# Patient Record
Sex: FEMALE | Race: WHITE | NOT HISPANIC OR LATINO | Employment: FULL TIME | URBAN - METROPOLITAN AREA
[De-identification: names, ages, dates, MRNs, and addresses within clinical notes are randomized per-mention and may not be internally consistent; named-entity substitution may affect disease eponyms.]

---

## 2024-09-19 ENCOUNTER — APPOINTMENT (OUTPATIENT)
Dept: CARDIOLOGY | Facility: CLINIC | Age: 34
End: 2024-09-19

## 2024-09-19 VITALS
HEART RATE: 88 BPM | SYSTOLIC BLOOD PRESSURE: 126 MMHG | BODY MASS INDEX: 26.62 KG/M2 | OXYGEN SATURATION: 97 % | HEIGHT: 61 IN | DIASTOLIC BLOOD PRESSURE: 84 MMHG | WEIGHT: 141 LBS

## 2024-09-19 DIAGNOSIS — R06.02 SHORTNESS OF BREATH: ICD-10-CM

## 2024-09-19 DIAGNOSIS — R61 DIAPHORESIS: ICD-10-CM

## 2024-09-19 DIAGNOSIS — R00.2 PALPITATIONS: ICD-10-CM

## 2024-09-19 DIAGNOSIS — R94.31 ABNORMAL EKG: ICD-10-CM

## 2024-09-19 DIAGNOSIS — R07.9 CHEST PAIN, UNSPECIFIED TYPE: Primary | ICD-10-CM

## 2024-09-19 DIAGNOSIS — R53.83 FATIGUE, UNSPECIFIED TYPE: ICD-10-CM

## 2024-09-19 DIAGNOSIS — M79.602 PAIN OF LEFT UPPER EXTREMITY: ICD-10-CM

## 2024-09-19 DIAGNOSIS — R42 DIZZINESS: ICD-10-CM

## 2024-09-19 DIAGNOSIS — Z87.898 HISTORY OF SEIZURE: ICD-10-CM

## 2024-09-19 DIAGNOSIS — Q24.9 CONGENITAL HEART DISEASE: ICD-10-CM

## 2024-09-19 PROCEDURE — 3008F BODY MASS INDEX DOCD: CPT | Performed by: INTERNAL MEDICINE

## 2024-09-19 PROCEDURE — 1036F TOBACCO NON-USER: CPT | Performed by: INTERNAL MEDICINE

## 2024-09-19 PROCEDURE — 99205 OFFICE O/P NEW HI 60 MIN: CPT | Performed by: INTERNAL MEDICINE

## 2024-09-19 RX ORDER — POLYMYXIN B SULFATE AND TRIMETHOPRIM 1; 10000 MG/ML; [USP'U]/ML
SOLUTION OPHTHALMIC
COMMUNITY

## 2024-09-19 RX ORDER — SERTRALINE HYDROCHLORIDE 25 MG/1
1 TABLET, FILM COATED ORAL DAILY
COMMUNITY

## 2024-09-19 RX ORDER — PAROXETINE 10 MG/1
1 TABLET, FILM COATED ORAL DAILY
COMMUNITY

## 2024-09-19 RX ORDER — SILVER SULFADIAZINE 10 G/1000G
CREAM TOPICAL
COMMUNITY

## 2024-09-19 RX ORDER — NORETHINDRONE ACETATE AND ETHINYL ESTRADIOL AND FERROUS FUMARATE 1MG-20(21)
KIT ORAL
COMMUNITY
Start: 2024-05-18

## 2024-09-19 RX ORDER — ALBUTEROL SULFATE 90 UG/1
2 INHALANT RESPIRATORY (INHALATION) EVERY 4 HOURS PRN
COMMUNITY
Start: 2024-03-02

## 2024-09-19 RX ORDER — AMOXICILLIN 500 MG/1
1 CAPSULE ORAL 3 TIMES DAILY
COMMUNITY

## 2024-09-19 NOTE — PROGRESS NOTES
CARDIOLOGY CONSULTATION NOTE       Patient:    Jose De Jesus Blackwood    YOB: 1990  MRN:    [id number]    Date:   9/19/2024     Reason for Visit: Consultation for chest pain symptoms.     IMPRESSION:      Chest pain  Shortness of breath, dyspnea on exertion  Left arm discomfort question angina  Lightheadedness with presyncope  Diaphoresis episodes  Congenital heart disease history, post heart closure at birth  Seizure episode, post delivery.  Abnormal resting electrocardiogram  Short [place] interval  Poor R wave anterior progression, question anterior ischemia.   Prior COVID history March 2024.  Otherwise as per assessment below.    RECOMMENDATIONS:      Patient has above-noted history and findings.  Given her presenting symptomatology, abnormal EKG and past history would suggest the following cardiovascular evaluation: Echocardiogram with contrast bubble study, 7-day Zio patch, Lexiscan perfusion stress testing and CT calcium scoring.  Laboratory studies including lipid profile thyroid function studies as well.  Further recommendations including possible invasive testing as warranted.    The patient will continue her current treatment otherwise.    Exercise dietary program.  Hydration.    [employer] portal use was encouraged.    We will plan to see back after the above testing with Laboratory Studies and ECG as noted.     Patient will follow up with their primary physician for general care.    The patient knows to contact medical care earlier if need be.    HPI:     Jose De Jesus Blackwood was seen in cardiac evaluation at the  Cardiology office September 19, 2024.      The patients problems are listed as in the impression above.    Electronic medical records reviewed.    Patient is a pleasant 34-year-old woman with history of congenital heart disease post heart hole closure at birth but no other significant cardiovascular history: Presents after emergency room visit 9/3/2024, [facility], for chest pain symptoms and dyspnea on  exertion.  ER records were reviewed.  She had a chest x-ray and laboratory studies including D-dimer which were negative.  Troponins were negative x 2.  EKG noted a short [place] interval poor R wave anterior progression and anterior possible ischemia.  She was discharged home and was suggested to follow-up with cardiology for further evaluation and treatment.    She presents now and states that she has had since chest discomfort with and without exertion.  She has dyspnea on exertion as well.  She does note occasional palpitations.  She has episodes where she gets pale and feels like she might pass out.  She has lightheadedness at times.  She has no other cardiovascular complaints.  She denies snoring.    Patient denies TIA or CVA symptoms.  No CHF or Edema.  No GI,  or Bleeding Issues. No Recent Fever or Chills.     Cardiovascular and general review of systems is otherwise negative.    A 14-system review is otherwise negative, other than noted.    ALLERGIES:     No Known Allergies     MEDICATIONS:     Current Outpatient Medications   Medication Instructions    albuterol 90 mcg/actuation inhaler 2 puffs, inhalation, Every 4 hours PRN    amoxicillin (Amoxil) 500 mg capsule 1 capsule, oral, 3 times daily    Junel FE 1/20, 28, 1 mg-20 mcg (21)/75 mg (7) tablet TAKE 1 TABLET BY MOUTH EVERY DAY AS DIRECTED FOR 28 DAYS    PARoxetine (Paxil) 10 mg tablet 1 tablet, oral, Daily    polymyxin B sulf-trimethoprim (Polytrim) ophthalmic solution INSTILL 1 DROP INTO AFFECTED EYE EVERY 6 HOURS    sertraline (Zoloft) 25 mg tablet 1 tablet, oral, Daily    silver sulfADIAZINE (Silvadene) 1 % cream APPLY 1 APPLICATION(S) TWICE A DAY BY TOPICAL ROUTE FOR 10 DAYS.       PAST MEDICAL HISTORY:   As per impression above.  No other significant past medical or surgical history appreciated.    SOCIAL HISTORY:   .  One 13-year-old child.    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    FAMILY HISTORY:   Negative family history of CAD    VITALS:     Vitals:    09/19/24 1248   BP: 126/84   Pulse: 88   SpO2: 97%       Wt Readings from Last 4 Encounters:   09/19/24 64 kg (141 lb)       PHYSICAL EXAMINATION:      General: No acute distress. Vital signs as noted. Alert and oriented.  Head And Neck Examination: No jugular venous distention, no carotid bruits, no mass. Carotid upstrokes preserved. Oral mucosa moist.  No xanthelasma. Head and neck examination otherwise unremarkable.  Lungs: Clear to auscultation and percussion. No wheezes, no rales,  and no rhonchi.  Chest: Excursion appeared to be normal. No chest wall tenderness on palpation.  Heart: Normal S1 and S2. No S3. No S4. No rub. Grade 1/6 systolic murmur, best heard at the left sternal border. Point of maximal impulse was within normal limits.  Abdomen: Soft. Nontender. No organomegaly. No bruits. No masses.  Extremities: No bipedal edema. No clubbing. No cyanosis.  Pulses are strong throughout. No bruits.  Musculoskeletal Exam: No ulcers, otherwise unremarkable.  Neuro: Neurologically appeared grossly intact.    ELECTROCARDIOGRAM:      9/3/2024:  Sinus rhythm, short [place] interval, poor wave anterior progression, question anterior T wave ischemic changes.    CARDIAC TESTING:      None this visit for review    LABORATORY DATA:      9/3/2024:  [facility] ER,  Chem-7, CBC, magnesium, D-dimer negative.  Troponins negative x 2.    RADIOLOGY:     Chest x-ray, [facility] ER, 9/3/2024:  DANIELA Morris MD, [facility] /  Cardiology      Of Note:  [employer] voice recognition dictation software was utilized partially in the preparation of this note, therefore, inaccuracies in spelling, word choice and punctuation may have occurred which were not recognized the time of signing.    Patient was seen and examined with total time of visit including chart preparation, rooming, and chart completion exceeding 40 minutes.    ----

## 2024-09-25 ENCOUNTER — LAB (OUTPATIENT)
Dept: LAB | Facility: LAB | Age: 34
End: 2024-09-25

## 2024-09-25 DIAGNOSIS — R06.02 SHORTNESS OF BREATH: ICD-10-CM

## 2024-09-25 DIAGNOSIS — R00.2 PALPITATIONS: ICD-10-CM

## 2024-09-25 DIAGNOSIS — R94.31 ABNORMAL EKG: ICD-10-CM

## 2024-09-25 DIAGNOSIS — R07.9 CHEST PAIN, UNSPECIFIED TYPE: ICD-10-CM

## 2024-09-25 DIAGNOSIS — Z87.898 HISTORY OF SEIZURE: ICD-10-CM

## 2024-09-25 DIAGNOSIS — M79.602 PAIN OF LEFT UPPER EXTREMITY: ICD-10-CM

## 2024-09-25 DIAGNOSIS — R53.83 FATIGUE, UNSPECIFIED TYPE: ICD-10-CM

## 2024-09-25 DIAGNOSIS — R42 DIZZINESS: ICD-10-CM

## 2024-09-25 DIAGNOSIS — R61 DIAPHORESIS: ICD-10-CM

## 2024-09-25 DIAGNOSIS — Q24.9 CONGENITAL HEART DISEASE: ICD-10-CM

## 2024-09-25 PROCEDURE — 85027 COMPLETE CBC AUTOMATED: CPT

## 2024-09-25 PROCEDURE — 86141 C-REACTIVE PROTEIN HS: CPT

## 2024-09-25 PROCEDURE — 36415 COLL VENOUS BLD VENIPUNCTURE: CPT

## 2024-09-25 PROCEDURE — 85652 RBC SED RATE AUTOMATED: CPT

## 2024-09-25 PROCEDURE — 84443 ASSAY THYROID STIM HORMONE: CPT

## 2024-09-25 PROCEDURE — 82248 BILIRUBIN DIRECT: CPT

## 2024-09-25 PROCEDURE — 80053 COMPREHEN METABOLIC PANEL: CPT

## 2024-09-25 PROCEDURE — 80061 LIPID PANEL: CPT

## 2024-09-26 LAB
ALBUMIN SERPL BCP-MCNC: 4.8 G/DL (ref 3.4–5)
ALP SERPL-CCNC: 51 U/L (ref 33–110)
ALT SERPL W P-5'-P-CCNC: 13 U/L (ref 7–45)
ANION GAP SERPL CALC-SCNC: 15 MMOL/L (ref 10–20)
AST SERPL W P-5'-P-CCNC: 13 U/L (ref 9–39)
BILIRUB DIRECT SERPL-MCNC: 0.1 MG/DL (ref 0–0.3)
BILIRUB SERPL-MCNC: 0.5 MG/DL (ref 0–1.2)
BUN SERPL-MCNC: 10 MG/DL (ref 6–23)
CALCIUM SERPL-MCNC: 9.8 MG/DL (ref 8.6–10.6)
CHLORIDE SERPL-SCNC: 100 MMOL/L (ref 98–107)
CHOLEST SERPL-MCNC: 208 MG/DL (ref 0–199)
CHOLESTEROL/HDL RATIO: 4.3
CO2 SERPL-SCNC: 27 MMOL/L (ref 21–32)
CREAT SERPL-MCNC: 0.52 MG/DL (ref 0.5–1.05)
CRP SERPL HS-MCNC: 5.5 MG/L
EGFRCR SERPLBLD CKD-EPI 2021: >90 ML/MIN/1.73M*2
ERYTHROCYTE [DISTWIDTH] IN BLOOD BY AUTOMATED COUNT: 12.3 % (ref 11.5–14.5)
ERYTHROCYTE [SEDIMENTATION RATE] IN BLOOD BY WESTERGREN METHOD: 6 MM/H (ref 0–20)
GLUCOSE SERPL-MCNC: 75 MG/DL (ref 74–99)
HCT VFR BLD AUTO: 44.2 % (ref 36–46)
HDLC SERPL-MCNC: 48.7 MG/DL
HGB BLD-MCNC: 14.6 G/DL (ref 12–16)
LDLC SERPL CALC-MCNC: 133 MG/DL
MCH RBC QN AUTO: 30.4 PG (ref 26–34)
MCHC RBC AUTO-ENTMCNC: 33 G/DL (ref 32–36)
MCV RBC AUTO: 92 FL (ref 80–100)
NON HDL CHOLESTEROL: 159 MG/DL (ref 0–149)
NRBC BLD-RTO: 0 /100 WBCS (ref 0–0)
PLATELET # BLD AUTO: 372 X10*3/UL (ref 150–450)
POTASSIUM SERPL-SCNC: 3.9 MMOL/L (ref 3.5–5.3)
PROT SERPL-MCNC: 7.9 G/DL (ref 6.4–8.2)
RBC # BLD AUTO: 4.81 X10*6/UL (ref 4–5.2)
SODIUM SERPL-SCNC: 138 MMOL/L (ref 136–145)
TRIGL SERPL-MCNC: 134 MG/DL (ref 0–149)
TSH SERPL-ACNC: 1.22 MIU/L (ref 0.44–3.98)
VLDL: 27 MG/DL (ref 0–40)
WBC # BLD AUTO: 10.4 X10*3/UL (ref 4.4–11.3)

## 2024-09-27 ENCOUNTER — OFFICE VISIT (OUTPATIENT)
Dept: PRIMARY CARE | Facility: CLINIC | Age: 34
End: 2024-09-27

## 2024-09-27 VITALS
WEIGHT: 141 LBS | SYSTOLIC BLOOD PRESSURE: 106 MMHG | OXYGEN SATURATION: 98 % | HEART RATE: 96 BPM | DIASTOLIC BLOOD PRESSURE: 62 MMHG | RESPIRATION RATE: 16 BRPM | HEIGHT: 61 IN | BODY MASS INDEX: 26.62 KG/M2

## 2024-09-27 DIAGNOSIS — Z30.9 ENCOUNTER FOR CONTRACEPTIVE MANAGEMENT, UNSPECIFIED TYPE: Primary | ICD-10-CM

## 2024-09-27 DIAGNOSIS — F32.A DEPRESSION, UNSPECIFIED DEPRESSION TYPE: ICD-10-CM

## 2024-09-27 PROCEDURE — 3008F BODY MASS INDEX DOCD: CPT | Performed by: INTERNAL MEDICINE

## 2024-09-27 PROCEDURE — 1036F TOBACCO NON-USER: CPT | Performed by: INTERNAL MEDICINE

## 2024-09-27 PROCEDURE — 99214 OFFICE O/P EST MOD 30 MIN: CPT | Performed by: INTERNAL MEDICINE

## 2024-09-27 RX ORDER — FLUOXETINE 20 MG/1
20 TABLET ORAL DAILY
Qty: 30 TABLET | Refills: 5 | Status: SHIPPED | OUTPATIENT
Start: 2024-09-27 | End: 2024-09-27 | Stop reason: SDUPTHER

## 2024-09-27 RX ORDER — FLUOXETINE 20 MG/1
20 TABLET ORAL DAILY
Qty: 30 TABLET | Refills: 5 | Status: SHIPPED | OUTPATIENT
Start: 2024-09-27 | End: 2025-03-26

## 2024-09-27 ASSESSMENT — PATIENT HEALTH QUESTIONNAIRE - PHQ9
SUM OF ALL RESPONSES TO PHQ9 QUESTIONS 1 AND 2: 4
7. TROUBLE CONCENTRATING ON THINGS, SUCH AS READING THE NEWSPAPER OR WATCHING TELEVISION: NEARLY EVERY DAY
4. FEELING TIRED OR HAVING LITTLE ENERGY: NEARLY EVERY DAY
1. LITTLE INTEREST OR PLEASURE IN DOING THINGS: MORE THAN HALF THE DAYS
6. FEELING BAD ABOUT YOURSELF - OR THAT YOU ARE A FAILURE OR HAVE LET YOURSELF OR YOUR FAMILY DOWN: NOT AT ALL
2. FEELING DOWN, DEPRESSED OR HOPELESS: MORE THAN HALF THE DAYS
SUM OF ALL RESPONSES TO PHQ QUESTIONS 1-9: 17
3. TROUBLE FALLING OR STAYING ASLEEP OR SLEEPING TOO MUCH: MORE THAN HALF THE DAYS
10. IF YOU CHECKED OFF ANY PROBLEMS, HOW DIFFICULT HAVE THESE PROBLEMS MADE IT FOR YOU TO DO YOUR WORK, TAKE CARE OF THINGS AT HOME, OR GET ALONG WITH OTHER PEOPLE: NOT DIFFICULT AT ALL
5. POOR APPETITE OR OVEREATING: NEARLY EVERY DAY
8. MOVING OR SPEAKING SO SLOWLY THAT OTHER PEOPLE COULD HAVE NOTICED. OR THE OPPOSITE, BEING SO FIGETY OR RESTLESS THAT YOU HAVE BEEN MOVING AROUND A LOT MORE THAN USUAL: MORE THAN HALF THE DAYS
9. THOUGHTS THAT YOU WOULD BE BETTER OFF DEAD, OR OF HURTING YOURSELF: NOT AT ALL

## 2024-09-27 NOTE — PROGRESS NOTES
"Subjective   Patient ID: [id number]     Jose De Jesus Blackwood is a 34 y.o. female who presents for New Patient Visit (Patient would like to disucss contraceptive pill today. //Patient would also like to review lab results today. ) and Palpitations (Chest pain and SOB, patient had EKG done on 9/4/2024).  No current outpatient medications on file.  HPI  34-year-old patient presented to the office today to establish care and discuss few concerns.    Patient has been recently experiencing episodes of chest pain and shortness of breath as well as episodes of palpitations.  She stated that she had a cardiac procedure when she was born but she is not really sure what kind of cardiac condition she had so most likely she had a congenital cardiac condition.  She said that the chest pain can be 4/10 in severity it is mostly located centrally into the left side sometimes if she feels a heaviness in her left arm and she feels short of breath with it.  It lasts for few minutes to hours at times and then it goes away.  She was seen and evaluated by cardiology and currently she is scheduled to proceed with stress test and an echocardiogram and calcium scoring test and a Holter monitor.  No loss of consciousness no syncope.    Patient is dealing with significant amount of stress in her life lately that could be contributing to her presentation she thinks she could be dealing with depression at this point because of the environmental stressors that she is dealing with.    Patient also has been discussed options for contraception.  Review of system was reviewed all normal except what is noted in HPI   Past Medical History:   Diagnosis Date    Abnormal ECG 9/3/2024      Objective   /62 (BP Location: Right arm, Patient Position: Sitting, BP Cuff Size: Adult)   Pulse 96   Resp 16   Ht 1.549 m (5' 1\")   Wt 64 kg (141 lb)   SpO2 98%   BMI 26.64 kg/m²      Physical Exam  Constitutional:       Appearance: Normal appearance.   Cardiovascular: "      Rate and Rhythm: Normal rate and regular rhythm.      Pulses: Normal pulses.      Heart sounds: Normal heart sounds.   Pulmonary:      Effort: Pulmonary effort is normal.      Breath sounds: Normal breath sounds.   Neurological:      Mental Status: She is alert.         Assessment/Plan   Problem List Items Addressed This Visit    None  34-year-old patient with the following issues.    1.  Status post recent cardiac evaluation for chest pain shortness of breath palpitation heaviness in her left arm and occasional lightheadedness.  Patient does have history of congenital heart disease?.  She is currently scheduled to proceed with stress test and echocardiogram and cardiac monitor as well as calcium scoring she was encouraged to go ahead and proceed with all these testing and to follow-up with cardiology as scheduled.    2.  Concerns regarding environmental stressors leading to depression she is can do a trial of Prozac and I will see her back in 6 to 8 weeks for follow-up.    3.  Contraception management patient wants to discuss IUD options with OB/GYN if it was provided.    No other active issues or concerns during this visit I will see the patient back in the office as previously scheduled and sooner if needed.    Rukhsana Zheng MD

## 2024-10-01 ENCOUNTER — APPOINTMENT (OUTPATIENT)
Dept: PRIMARY CARE | Facility: CLINIC | Age: 34
End: 2024-10-01

## 2024-10-03 ENCOUNTER — APPOINTMENT (OUTPATIENT)
Dept: PRIMARY CARE | Facility: CLINIC | Age: 34
End: 2024-10-03

## 2024-10-16 ENCOUNTER — APPOINTMENT (OUTPATIENT)
Dept: CARDIOLOGY | Facility: CLINIC | Age: 34
End: 2024-10-16

## 2024-10-16 ENCOUNTER — HOSPITAL ENCOUNTER (OUTPATIENT)
Dept: CARDIOLOGY | Facility: CLINIC | Age: 34
Discharge: HOME | End: 2024-10-16

## 2024-10-16 ENCOUNTER — HOSPITAL ENCOUNTER (OUTPATIENT)
Dept: RADIOLOGY | Facility: CLINIC | Age: 34
Discharge: HOME | End: 2024-10-16

## 2024-10-16 DIAGNOSIS — R53.83 FATIGUE, UNSPECIFIED TYPE: ICD-10-CM

## 2024-10-16 DIAGNOSIS — R94.31 ABNORMAL EKG: ICD-10-CM

## 2024-10-16 DIAGNOSIS — R42 DIZZINESS: ICD-10-CM

## 2024-10-16 DIAGNOSIS — R00.2 PALPITATIONS: ICD-10-CM

## 2024-10-16 DIAGNOSIS — M79.602 PAIN OF LEFT UPPER EXTREMITY: ICD-10-CM

## 2024-10-16 DIAGNOSIS — Z87.898 HISTORY OF SEIZURE: ICD-10-CM

## 2024-10-16 DIAGNOSIS — R61 DIAPHORESIS: ICD-10-CM

## 2024-10-16 DIAGNOSIS — Q24.9 CONGENITAL HEART DISEASE: ICD-10-CM

## 2024-10-16 DIAGNOSIS — Q24.9 CONGENITAL HEART DISEASE: Primary | ICD-10-CM

## 2024-10-16 DIAGNOSIS — R06.02 SHORTNESS OF BREATH: ICD-10-CM

## 2024-10-16 DIAGNOSIS — R07.9 CHEST PAIN, UNSPECIFIED TYPE: ICD-10-CM

## 2024-10-16 PROCEDURE — 93306 TTE W/DOPPLER COMPLETE: CPT

## 2024-10-16 PROCEDURE — 3430000001 HC RX 343 DIAGNOSTIC RADIOPHARMACEUTICALS: Performed by: INTERNAL MEDICINE

## 2024-10-16 PROCEDURE — A9502 TC99M TETROFOSMIN: HCPCS | Performed by: INTERNAL MEDICINE

## 2024-10-16 PROCEDURE — 93306 TTE W/DOPPLER COMPLETE: CPT | Performed by: INTERNAL MEDICINE

## 2024-10-16 PROCEDURE — 2500000004 HC RX 250 GENERAL PHARMACY W/ HCPCS (ALT 636 FOR OP/ED): Performed by: INTERNAL MEDICINE

## 2024-10-16 PROCEDURE — 78452 HT MUSCLE IMAGE SPECT MULT: CPT

## 2024-10-16 PROCEDURE — 93017 CV STRESS TEST TRACING ONLY: CPT

## 2024-10-16 RX ORDER — REGADENOSON 0.08 MG/ML
0.4 INJECTION, SOLUTION INTRAVENOUS ONCE
Status: COMPLETED | OUTPATIENT
Start: 2024-10-16 | End: 2024-10-16

## 2024-10-17 LAB
AORTIC VALVE MEAN GRADIENT: 2.1 MMHG
AORTIC VALVE PEAK VELOCITY: 0.9 M/S
AV PEAK GRADIENT: 3.2 MMHG
AVA (PEAK VEL): 2.81 CM2
AVA (VTI): 2.7 CM2
EJECTION FRACTION APICAL 4 CHAMBER: 71.2
EJECTION FRACTION: 60 %
LEFT ATRIUM VOLUME AREA LENGTH INDEX BSA: 16.9 ML/M2
LEFT VENTRICLE INTERNAL DIMENSION DIASTOLE: 3.91 CM (ref 3.5–6)
LEFT VENTRICULAR OUTFLOW TRACT DIAMETER: 1.91 CM
LV EJECTION FRACTION BIPLANE: 63 %
MITRAL VALVE E/A RATIO: 2.1
RIGHT VENTRICLE FREE WALL PEAK S': 13.44 CM/S
TRICUSPID ANNULAR PLANE SYSTOLIC EXCURSION: 2.1 CM

## 2024-10-30 ENCOUNTER — APPOINTMENT (OUTPATIENT)
Dept: PRIMARY CARE | Facility: CLINIC | Age: 34
End: 2024-10-30

## 2024-11-06 ENCOUNTER — APPOINTMENT (OUTPATIENT)
Dept: OBSTETRICS AND GYNECOLOGY | Facility: CLINIC | Age: 34
End: 2024-11-06

## 2024-11-06 VITALS
BODY MASS INDEX: 27.19 KG/M2 | WEIGHT: 144 LBS | DIASTOLIC BLOOD PRESSURE: 58 MMHG | SYSTOLIC BLOOD PRESSURE: 130 MMHG | HEIGHT: 61 IN

## 2024-11-06 DIAGNOSIS — Z30.9 ENCOUNTER FOR CONTRACEPTIVE MANAGEMENT, UNSPECIFIED TYPE: ICD-10-CM

## 2024-11-06 PROCEDURE — 1036F TOBACCO NON-USER: CPT | Performed by: ADVANCED PRACTICE MIDWIFE

## 2024-11-06 PROCEDURE — 3008F BODY MASS INDEX DOCD: CPT | Performed by: ADVANCED PRACTICE MIDWIFE

## 2024-11-06 PROCEDURE — 99203 OFFICE O/P NEW LOW 30 MIN: CPT | Performed by: ADVANCED PRACTICE MIDWIFE

## 2024-11-06 RX ORDER — NORETHINDRONE ACETATE AND ETHINYL ESTRADIOL 1.5-30(21)
1 KIT ORAL DAILY
Qty: 28 TABLET | Refills: 11 | Status: SHIPPED | OUTPATIENT
Start: 2024-11-06

## 2024-11-06 ASSESSMENT — PAIN SCALES - GENERAL: PAINLEVEL_OUTOF10: 0-NO PAIN

## 2024-11-06 NOTE — PROGRESS NOTES
Subjective   Patient ID: Jose De Jesus Blackwood is a 34 y.o. female who presents for New Patient Visit (Discuss BC ( has had Mirena in the past )/Per pt last pap was in [place] ( neg ) 2023).    HPI    Pt. Here to discuss contraception    Had a Mirena [id number]  took it out because she wasn't sexually active-was overall happy with bleeding pattern     Was on OCPs, put on about a year ago, ran out a few months ago-likes these ok, thinking about restarting those today   Denies any hx of migraine, DVT, smoking, HTN    Last pap in [place] ~ 1 year ago  No history of abnormal paps     LMP: 10/16-10/23  , no contraception currently but does not desire pregnancy       Does not desire future fertility     Does not currently have insurance so is worried about cost     Review of Systems    Objective   Physical Exam  Constitutional:       Appearance: Normal appearance. She is normal weight.   Neurological:      Mental Status: She is alert.   Psychiatric:         Mood and Affect: Mood normal.         Behavior: Behavior normal.         Thought Content: Thought content normal.         Judgment: Judgment normal.         Assessment/Plan   Problem List Items Addressed This Visit             ICD-10-CM       Medium    Encounter for contraceptive management Z30.9    Relevant Medications    norethindrone-e.estradioL-iron (Microgestin FE 1.5/30) 1.5 mg-30 mcg (21)/75 mg (7) tablet            ELISE Katz 24 9:03 AM

## 2024-11-06 NOTE — ASSESSMENT & PLAN NOTE
Discussed Reietta as a cost effective option or seeking our services at a family planning clinic or something with a fee schedule   Will utilize good RX now for her  OCP

## 2024-11-10 ASSESSMENT — PROMIS GLOBAL HEALTH SCALE
RATE_QUALITY_OF_LIFE: EXCELLENT
RATE_MENTAL_HEALTH: VERY GOOD
RATE_SOCIAL_SATISFACTION: EXCELLENT
RATE_AVERAGE_FATIGUE: MILD
CARRYOUT_PHYSICAL_ACTIVITIES: COMPLETELY
RATE_GENERAL_HEALTH: EXCELLENT
RATE_AVERAGE_PAIN: 0
CARRYOUT_SOCIAL_ACTIVITIES: EXCELLENT
RATE_PHYSICAL_HEALTH: VERY GOOD
EMOTIONAL_PROBLEMS: NEVER

## 2024-11-13 ENCOUNTER — HOSPITAL ENCOUNTER (OUTPATIENT)
Dept: RADIOLOGY | Facility: HOSPITAL | Age: 34
Discharge: HOME | End: 2024-11-13

## 2024-11-13 DIAGNOSIS — R07.9 CHEST PAIN, UNSPECIFIED TYPE: ICD-10-CM

## 2024-11-13 DIAGNOSIS — R61 DIAPHORESIS: ICD-10-CM

## 2024-11-13 DIAGNOSIS — M79.602 PAIN OF LEFT UPPER EXTREMITY: ICD-10-CM

## 2024-11-13 DIAGNOSIS — Q24.9 CONGENITAL HEART DISEASE: ICD-10-CM

## 2024-11-13 DIAGNOSIS — R00.2 PALPITATIONS: ICD-10-CM

## 2024-11-13 DIAGNOSIS — R06.02 SHORTNESS OF BREATH: ICD-10-CM

## 2024-11-13 DIAGNOSIS — R94.31 ABNORMAL EKG: ICD-10-CM

## 2024-11-13 DIAGNOSIS — R53.83 FATIGUE, UNSPECIFIED TYPE: ICD-10-CM

## 2024-11-13 DIAGNOSIS — Z87.898 HISTORY OF SEIZURE: ICD-10-CM

## 2024-11-13 DIAGNOSIS — R42 DIZZINESS: ICD-10-CM

## 2024-11-14 ENCOUNTER — APPOINTMENT (OUTPATIENT)
Dept: PRIMARY CARE | Facility: CLINIC | Age: 34
End: 2024-11-14

## 2024-11-14 VITALS
BODY MASS INDEX: 26.81 KG/M2 | HEIGHT: 61 IN | WEIGHT: 142 LBS | DIASTOLIC BLOOD PRESSURE: 72 MMHG | SYSTOLIC BLOOD PRESSURE: 106 MMHG | HEART RATE: 74 BPM | OXYGEN SATURATION: 99 %

## 2024-11-14 DIAGNOSIS — M25.572 ACUTE LEFT ANKLE PAIN: ICD-10-CM

## 2024-11-14 DIAGNOSIS — Z00.00 HEALTH CARE MAINTENANCE: Primary | ICD-10-CM

## 2024-11-14 DIAGNOSIS — F32.A DEPRESSION, UNSPECIFIED DEPRESSION TYPE: ICD-10-CM

## 2024-11-14 PROCEDURE — 3008F BODY MASS INDEX DOCD: CPT | Performed by: INTERNAL MEDICINE

## 2024-11-14 PROCEDURE — 99395 PREV VISIT EST AGE 18-39: CPT | Performed by: INTERNAL MEDICINE

## 2024-11-14 RX ORDER — FLUOXETINE 20 MG/1
20 TABLET ORAL DAILY
Qty: 30 TABLET | Refills: 5 | Status: SHIPPED | OUTPATIENT
Start: 2024-11-14 | End: 2025-05-13

## 2024-11-14 RX ORDER — NAPROXEN 500 MG/1
500 TABLET ORAL 2 TIMES DAILY PRN
Qty: 60 TABLET | Refills: 0 | Status: SHIPPED | OUTPATIENT
Start: 2024-11-14 | End: 2025-02-12

## 2024-11-14 ASSESSMENT — PATIENT HEALTH QUESTIONNAIRE - PHQ9
1. LITTLE INTEREST OR PLEASURE IN DOING THINGS: NOT AT ALL
SUM OF ALL RESPONSES TO PHQ9 QUESTIONS 1 AND 2: 0
2. FEELING DOWN, DEPRESSED OR HOPELESS: NOT AT ALL

## 2024-11-14 NOTE — PROGRESS NOTES
"`Subjective   Patient ID: [id number]     Jose De Jesus Blackwood is a 34 y.o. female who presents for Annual Exam.    Current Outpatient Medications:     FLUoxetine (PROzac) 20 mg tablet, Take 1 tablet (20 mg) by mouth once daily., Disp: 30 tablet, Rfl: 5    norethindrone-e.estradioL-iron (Microgestin FE 1.5/30) 1.5 mg-30 mcg (21)/75 mg (7) tablet, Take 1 tablet by mouth once daily., Disp: 28 tablet, Rfl: 11  HPI  34-year-old patient presented to the office today for her annual exam patient is doing well denying episodes of chest pain and difficulty breathing she has been thoroughly evaluated by cardiology for these exact reasons with occasional episodes of chest pain and shortness of breath her stress test came back negative for ischemia and cardiac monitor came back negative for any significant abnormalities her echocardiogram did not indicate the presence of any valvular heart disease patient was reassured.  Now she thinks her episodes were related to anxiety I did a trial of Prozac she has not taken the medication lately she is dealing with significant amount of stress in her life lately and now she is more open to trying the Prozac.    She denies abdominal pain nausea vomiting changes in the bowel movement or blood in the stool no urine symptoms.  Review of system was reviewed all normal except what is noted in HPI   Past Medical History:   Diagnosis Date    Abnormal ECG 9/3/2024      Objective   /72 (BP Location: Left arm, Patient Position: Sitting, BP Cuff Size: Adult)   Pulse 74   Ht 1.549 m (5' 1\")   Wt 64.4 kg (142 lb)   LMP 10/16/2024   SpO2 99%   BMI 26.83 kg/m²      Physical Exam  Constitutional:       Appearance: Normal appearance.   Cardiovascular:      Rate and Rhythm: Normal rate and regular rhythm.      Pulses: Normal pulses.      Heart sounds: Normal heart sounds.   Pulmonary:      Effort: Pulmonary effort is normal.      Breath sounds: Normal breath sounds.   Chest:      Comments: Breast exam " completed no masses asymmetry or discharge.  Neurological:      Mental Status: She is alert.         Assessment/Plan   Problem List Items Addressed This Visit    None  34-year-old patient with the following issues.    1.  Generalized anxiety disorder patient is dealing with a lot of stress in her life lately and we did do a trial of Prozac patient suspended it and did not actually take it but now she is willing to take it and do a trial if she does I will see her back in 6 to 8 weeks for follow-up.    2.  Previous evaluation for occasional episodes of chest pain and shortness of breath status post negative workup and evaluation with negative stress test cardiac monitor and echocardiogram patient is reassured.    3.  Healthcare maintenance issues patient is up-to-date on her Pap smear and preventative care measures.    Disposition I will see the patient back in the office in 1 year for repeat physical in 6 weeks for follow-up.    Rukhsana Zheng MD

## 2024-12-12 ENCOUNTER — APPOINTMENT (OUTPATIENT)
Dept: CARDIOLOGY | Facility: CLINIC | Age: 34
End: 2024-12-12

## 2024-12-12 VITALS
DIASTOLIC BLOOD PRESSURE: 72 MMHG | WEIGHT: 143 LBS | HEART RATE: 95 BPM | BODY MASS INDEX: 27 KG/M2 | HEIGHT: 61 IN | SYSTOLIC BLOOD PRESSURE: 102 MMHG | OXYGEN SATURATION: 96 %

## 2024-12-12 DIAGNOSIS — R94.31 ABNORMAL EKG: ICD-10-CM

## 2024-12-12 DIAGNOSIS — R00.2 PALPITATIONS: ICD-10-CM

## 2024-12-12 DIAGNOSIS — Z87.898 HISTORY OF SEIZURE: ICD-10-CM

## 2024-12-12 DIAGNOSIS — R07.9 CHEST PAIN, UNSPECIFIED TYPE: Primary | ICD-10-CM

## 2024-12-12 DIAGNOSIS — R53.83 FATIGUE, UNSPECIFIED TYPE: ICD-10-CM

## 2024-12-12 DIAGNOSIS — Q24.9 CONGENITAL HEART DISEASE: ICD-10-CM

## 2024-12-12 DIAGNOSIS — R06.02 SHORTNESS OF BREATH: ICD-10-CM

## 2024-12-12 PROCEDURE — 99214 OFFICE O/P EST MOD 30 MIN: CPT | Performed by: INTERNAL MEDICINE

## 2024-12-12 PROCEDURE — 1036F TOBACCO NON-USER: CPT | Performed by: INTERNAL MEDICINE

## 2024-12-12 PROCEDURE — 3008F BODY MASS INDEX DOCD: CPT | Performed by: INTERNAL MEDICINE

## 2024-12-12 NOTE — PROGRESS NOTES
CARDIOLOGY OFFICE NOTE     Date:   12/12/2024    Patient:    Jose De Jesus Blackwood    YOB: 1990    Primary Physician: Rukhsana Zheng MD       Reason for Visit: Cardiology follow-up post  testing.    HPI:     Jose De Jesus Blackwood was seen in cardiac evaluation at the  Cardiology office December 12, 2024.      The patients problems are listed as in the impression below.    Electronic medical records reviewed.    Patient returns.  She states that she feels better.  Her testing overall unremarkable as noted below including negative Holter monitor, Lexiscan perfusion stress test and echocardiogram.  No residual congenital heart disease was appreciated.    She feels better knowing this.  She states that she has less palpitations/chest pain.  They are not necessarily exertional.    Patient denies SOB, Lightheadedness, Dizziness, TIA or CVA symptoms.  No CHF or Edema.  No GI,  or Bleeding Issues. No Recent Fever or Chills.     Cardiovascular and general review of systems is otherwise negative.    A 14-system review is otherwise negative, other than noted.     PHYSICAL EXAMINATION:      Vitals:    12/12/24 0827   BP: 102/72   Pulse: 95   SpO2: 96%     General: No acute distress. Alert and oriented.  Head And Neck Examination: No jugular venous distention, no carotid bruits, no mass. Carotid upstrokes preserved. Oral mucosa moist.  No xanthelasma. Head and neck examination otherwise unremarkable.  Lungs: Clear to auscultation and percussion. No wheezes, no rales,  and no rhonchi.  Chest: Excursion appeared to be normal. No chest wall tenderness on palpation.  Heart: Normal S1 and S2. No S3. No S4. No rub. Grade 1/6 systolic murmur, best heard at the left sternal border. Point of maximal impulse was within normal limits.  Abdomen: Soft. Nontender. No organomegaly. No bruits. No masses.  Extremities: No bipedal edema. No clubbing. No cyanosis.  Pulses are strong throughout. No bruits.  Musculoskeletal Exam: No ulcers,  otherwise unremarkable.  Neuro: Neurologically appeared grossly intact.  .  IMPRESSION:      Cardiovascular status stable  Chest pain, improved  Shortness of breath, improved  Lightheadedness with presyncope, no recurrence  Diaphoresis episodes, resolved  Congenital heart disease history, post heart closure at birth, no residual by echocardiogram 10/2024.  Negative Lexiscan perfusion stress test, 10/2024.  Normal LV systolic function, LVEF 60%, echocardiogram 10 / 2024.  Abnormal resting electrocardiogram  Short [place] interval  Poor R wave anterior progression, question anterior ischemia.   Negative Holter monitor for significant dysrhythmias 10/2024.  Seizure episode, post delivery.  Prior COVID history March 2024.  Otherwise as per assessment below.    RECOMMENDATIONS:      The patient was reassured overall.  Would suggest continuing her current conservative treatment.  Exercise dietary program was encouraged.    Hydration.    [employer] portal use was encouraged.    We will plan to see back in 1 year with Laboratory Studies and ECG as ordered.     Patient will follow up with their primary physician for general care.    The patient knows to contact medical care earlier if need be.      ALLERGIES:     No Known Allergies     MEDICATIONS:     Current Outpatient Medications   Medication Instructions    FLUoxetine (PROZAC) 20 mg, oral, Daily    naproxen (NAPROSYN) 500 mg, oral, 2 times daily PRN    norethindrone-e.estradioL-iron (Microgestin FE 1.5/30) 1.5 mg-30 mcg (21)/75 mg (7) tablet 1 tablet, oral, Daily       ELECTROCARDIOGRAM:      None this visit    CARDIAC TESTING:      Lexiscan Myoview perfusion stress test, 10/2024:  Negative study for significant coronary artery disease.  No ischemia or myocardial infarction.  LVEF 88%.      Echocardiogram, 10/2024:  Negative for residual congenital heart disease findings.  Normal LV function ejection fraction 60%.  No significant valvular heart disease.    Holter monitor,  10/2024:  Negative for significant dysrhythmias.    LABORATORY DATA:      CBC:   Lab Results   Component Value Date    WBC 10.4 09/25/2024    RBC 4.81 09/25/2024    HGB 14.6 09/25/2024    HCT 44.2 09/25/2024     09/25/2024        CMP:    Lab Results   Component Value Date     09/25/2024    K 3.9 09/25/2024     09/25/2024    CO2 27 09/25/2024    BUN 10 09/25/2024    CREATININE 0.52 09/25/2024    GLUCOSE 75 09/25/2024    CALCIUM 9.8 09/25/2024     Lipid Profile:    Lab Results   Component Value Date    CHOL 208 (H) 09/25/2024    TRIG 134 09/25/2024    HDL 48.7 09/25/2024       Hepatic Function Panel:    Lab Results   Component Value Date    ALKPHOS 51 09/25/2024    ALT 13 09/25/2024    AST 13 09/25/2024    PROT 7.9 09/25/2024    BILITOT 0.5 09/25/2024    BILIDIR 0.1 09/25/2024       TSH:    Lab Results   Component Value Date    TSH 1.22 09/25/2024                   PROBLEM LIST:     Patient Active Problem List   Diagnosis    Chest pain    Abnormal EKG    Fatigue    Palpitations    Shortness of breath    Dizziness    Pain of left upper extremity    Congenital heart disease    History of seizure    Diaphoresis    Encounter for contraceptive management             Luan Morris MD, FACC   [facility] /  Cardiology      Of Note:  [employer] voice recognition dictation software was utilized partially in the preparation of this note, therefore, inaccuracies in spelling, word choice and punctuation may have occurred which were not recognized the time of signing.    Patient was seen and examined with total time of visit including chart preparation, rooming, and chart completion exceeding 40 minutes.      ----

## 2024-12-12 NOTE — PATIENT INSTRUCTIONS
Exercise diet weight loss program.    Hydrate    Use My Chart portal for reviewing records, testing and contacting office.     Avoid cafienated beverages.

## 2025-01-13 ENCOUNTER — TELEPHONE (OUTPATIENT)
Dept: PRIMARY CARE | Facility: CLINIC | Age: 35
End: 2025-01-13

## 2025-01-14 ENCOUNTER — OFFICE VISIT (OUTPATIENT)
Dept: PRIMARY CARE | Facility: CLINIC | Age: 35
End: 2025-01-14

## 2025-01-14 VITALS
RESPIRATION RATE: 18 BRPM | DIASTOLIC BLOOD PRESSURE: 66 MMHG | OXYGEN SATURATION: 98 % | SYSTOLIC BLOOD PRESSURE: 118 MMHG | WEIGHT: 143 LBS | TEMPERATURE: 98 F | HEART RATE: 80 BPM | BODY MASS INDEX: 27.02 KG/M2

## 2025-01-14 DIAGNOSIS — R21 SKIN RASH: Primary | ICD-10-CM

## 2025-01-14 PROCEDURE — 99213 OFFICE O/P EST LOW 20 MIN: CPT | Performed by: INTERNAL MEDICINE

## 2025-01-14 ASSESSMENT — PATIENT HEALTH QUESTIONNAIRE - PHQ9
SUM OF ALL RESPONSES TO PHQ9 QUESTIONS 1 AND 2: 0
2. FEELING DOWN, DEPRESSED OR HOPELESS: NOT AT ALL
1. LITTLE INTEREST OR PLEASURE IN DOING THINGS: NOT AT ALL

## 2025-01-14 NOTE — PROGRESS NOTES
Subjective   Patient ID: [id number]     Jose De Jesus Blackwood is a 34 y.o. female who presents for Rash.    Current Outpatient Medications:     naproxen (Naprosyn) 500 mg tablet, Take 1 tablet (500 mg) by mouth 2 times a day as needed for mild pain (1 - 3) (pain)., Disp: 60 tablet, Rfl: 0    norethindrone-e.estradioL-iron (Microgestin FE 1.5/30) 1.5 mg-30 mcg (21)/75 mg (7) tablet, Take 1 tablet by mouth once daily., Disp: 28 tablet, Rfl: 11  HPI  34-year-old patient presented to the office today to discuss concerns regarding skin rash.    Patient stated that she was feeling really well and had no concerns she took right with [employer] to her destination and once she left the car she started experiencing severe itching involving her upper extremity bilateral and her left axilla.  The itching and pruritus were intense and then she noted the presence of prickly red rash all over her upper extremity.  Initially it was really red and very itchy she took Benadryl that night and now it seems to have improved it is about 70 to 80% improved but definitely still there she does not recall starting using any new detergents or cleaning supplies or shower gel or shampoo.  She does not recall exposure to any powder or any chemicals or anything she does not recall eating anything new out of her ordinary routine. .  Review of system was reviewed all normal except what is noted in HPI   Past Medical History:   Diagnosis Date    Abnormal ECG 9/3/2024      Objective   /66   Pulse 80   Temp 36.7 °C (98 °F)   Resp 18   Wt 64.9 kg (143 lb)   SpO2 98%   BMI 27.02 kg/m²      Physical Exam  Constitutional:       Appearance: Normal appearance.   Cardiovascular:      Rate and Rhythm: Normal rate and regular rhythm.      Pulses: Normal pulses.      Heart sounds: Normal heart sounds.   Pulmonary:      Effort: Pulmonary effort is normal.      Breath sounds: Normal breath sounds.   Skin:     Comments: Extensive small prickly maculopapular eruption  noted on both upper extremities no swelling no warmth no redness.   Neurological:      Mental Status: She is alert.         Assessment/Plan   Problem List Items Addressed This Visit    None    34-year-old patient with the following issues.    1.  Diffuse maculopapular eruption involving the upper extremities bilaterally currently fading and less intense and less itchy than previous I am not really sure what the trigger is could be irritant dermatitis could be related to exposure to an irritant at this point I instructed her to apply hydrocortisone 1% to the area and to take Zyrtec during the day and diphenhydramine during the nighttime and to see dermatology for further evaluation if it does not resolve patient stated understanding all her questions were addressed no other active issues or concerns.  Rukhsana Zheng MD

## 2025-05-13 ENCOUNTER — HOSPITAL ENCOUNTER (OUTPATIENT)
Dept: RADIOLOGY | Facility: CLINIC | Age: 35
Discharge: HOME | End: 2025-05-13

## 2025-05-13 ENCOUNTER — OFFICE VISIT (OUTPATIENT)
Dept: ORTHOPEDIC SURGERY | Facility: CLINIC | Age: 35
End: 2025-05-13

## 2025-05-13 DIAGNOSIS — M25.472 LEFT ANKLE SWELLING: ICD-10-CM

## 2025-05-13 DIAGNOSIS — M25.572 ACUTE LEFT ANKLE PAIN: ICD-10-CM

## 2025-05-13 DIAGNOSIS — S93.402A SPRAIN OF LEFT ANKLE, INITIAL ENCOUNTER: Primary | ICD-10-CM

## 2025-05-13 DIAGNOSIS — M76.822 POSTERIOR TIBIAL TENDINITIS OF LEFT LOWER EXTREMITY: ICD-10-CM

## 2025-05-13 PROCEDURE — 99212 OFFICE O/P EST SF 10 MIN: CPT | Performed by: FAMILY MEDICINE

## 2025-05-13 PROCEDURE — 1036F TOBACCO NON-USER: CPT | Performed by: FAMILY MEDICINE

## 2025-05-13 PROCEDURE — L4361 PNEUMA/VAC WALK BOOT PRE OTS: HCPCS | Performed by: FAMILY MEDICINE

## 2025-05-13 PROCEDURE — 73610 X-RAY EXAM OF ANKLE: CPT | Mod: LT

## 2025-05-13 PROCEDURE — 99204 OFFICE O/P NEW MOD 45 MIN: CPT | Performed by: FAMILY MEDICINE

## 2025-05-13 PROCEDURE — 73610 X-RAY EXAM OF ANKLE: CPT | Mod: LEFT SIDE | Performed by: FAMILY MEDICINE

## 2025-05-13 RX ORDER — METHYLPREDNISOLONE 4 MG/1
TABLET ORAL
Qty: 1 EACH | Refills: 0 | Status: SHIPPED | OUTPATIENT
Start: 2025-05-13

## 2025-05-13 NOTE — PROGRESS NOTES
Acute Injury New Patient Visit  Assessment & Plan  Ankle sprain  Acute ankle sprain with significant soft tissue swelling and tenderness over the medial malleolus. No fracture on x-ray. Limited range of motion with pain. History of previous ankle injury.  - Provide walking boot for stabilization.  - Prescribe Medrol Dosepak.  - Instruct to hold off on naproxen until completion of steroid pack.  - Advise icing and elevation.  - Provide gentle range of motion exercises.  - Re-evaluate in three weeks, consider further imaging if pain persists.    Ankle swelling  Significant swelling in the ankle, particularly in the posterior tibial tendon area and across the top of the foot. Persistent swelling may require further evaluation.  - Prescribe Medrol Dosepak.  - Advise icing and elevation.  - Provide gentle range of motion exercises.    Posterior tibial tendonitis  Moderate tenderness and fullness to the posterior tibial tendon. Limited plantar flexion, dorsiflexion, eversion, and inversion. Persistent issues may require ultrasound or MRI.  - Provide walking boot to reduce strain.  - Prescribe Medrol Dosepak.  - Instruct to perform gentle range of motion exercises.  - Advise icing and elevation.    Orders Placed This Encounter    Walking boot    XR ankle left 3+ views    methylPREDNISolone (Medrol Dospak) 4 mg tablets     Procedures   At the conclusion of the visit there were no further questions by the patient/family regarding their plan of care.  Patient was instructed to call or return with any issues, questions, or concerns regarding their injury and/or treatment plan described above.    PHYSICAL EXAM:  General:  Patient is awake, alert, and oriented to person place and time.  Patient appears well nourished and well kept.  Affect Calm, Not Acutely Distressed.  Heent:  Normocephalic, Atraumatic, EOMI  Cardiovascular:  Hemodynamically stable.  Respiratory:  Normal respirations with unlabored breathing.  Neuro: Gross  sensation intact to the lower extremities bilaterally.  Extremity: Right lower extremity exam as below  Physical Exam  EXTREMITIES: Swelling in the posterior ankle. Moderate tenderness over the medial malleolus. Achilles tendon intact and tight, no erythema. No open cuts, wounds, or sores. Limited plantar flexion, dorsiflexion, eversion, and inversion. Distal metatarsals nontender. Mild discomfort of the dorsum of the foot. Unable to tolerate full weight bearing, moderate antalgic gait.    IMAGING:   Results  RADIOLOGY  Ankle X-ray: No fracture (05/10/2025)  XR ankle left 3+ views  Narrative: Interpreted By:  Budinsky, Cole,   STUDY:  XR ANKLE LEFT 3+ VIEWS; ;  5/13/2025 4:25 pm      INDICATION:  Signs/Symptoms:pain.      ACCESSION NUMBER(S):  [id number]      ORDERING CLINICIAN:  COLE BUDINSKY      Impression: Three views left ankle demonstrate no presence for acute fracture or  dislocation. Ankle joint mortise intact and preserved. No obvious  avulsion injury or bony abnormality present. Soft tissue swelling  noted medially and laterally.          Signed by: Cole Budinsky 5/13/2025 5:53 PM  Dictation workstation:   [id number]      Patient ID: Jose De Jesus Blackwood is a 35 y.o. female who presents for Pain of the Left Ankle.  History of Present Illness  Jose De Jesus Blackwood is a 35 year old female who presents with an ankle sprain and swelling.    She sprained her ankle on Saturday while walking. Initially, she managed the injury with an ACE wrap and an ankle stabilizer. She has a history of a similar ankle sprain two years ago, affecting the same ankle.    Pain is localized to the side of the ankle, with stiffness that worsens in cold weather. Swelling is present at the back of the ankle, with the most intense pain in a specific area. No pain is reported on the top of the ankle, outside of the ankle, or feet, except for the specific areas mentioned.    She is currently taking naproxen. Her occupation in internal medicine requires  prolonged standing, which has been challenging due to the ankle pain. No history of autoimmune disorders or rheumatoid arthritis.        This medical note was created with the assistance of artificial intelligence (AI) for documentation purposes. The content has been reviewed and confirmed by the healthcare provider for accuracy and completeness. Patient consented to the use of audio recording and use of AI during their visit.   05/13/25 at 6:40 PM - Cole C Budinsky, MD  Office:  [phone]

## 2025-05-22 DIAGNOSIS — M25.572 LEFT ANKLE PAIN, UNSPECIFIED CHRONICITY: Primary | ICD-10-CM

## 2025-05-27 ENCOUNTER — OFFICE VISIT (OUTPATIENT)
Dept: ORTHOPEDIC SURGERY | Facility: CLINIC | Age: 35
End: 2025-05-27

## 2025-05-27 ENCOUNTER — HOSPITAL ENCOUNTER (OUTPATIENT)
Dept: RADIOLOGY | Facility: CLINIC | Age: 35
Discharge: HOME | End: 2025-05-27

## 2025-05-27 DIAGNOSIS — M25.872 ANKLE IMPINGEMENT SYNDROME, LEFT: ICD-10-CM

## 2025-05-27 DIAGNOSIS — M25.572 LEFT ANKLE PAIN, UNSPECIFIED CHRONICITY: ICD-10-CM

## 2025-05-27 DIAGNOSIS — M76.822 POSTERIOR TIBIAL TENDINITIS OF LEFT LOWER EXTREMITY: Primary | ICD-10-CM

## 2025-05-27 DIAGNOSIS — M19.072 ARTHRITIS OF LEFT SUBTALAR JOINT: ICD-10-CM

## 2025-05-27 PROCEDURE — 73610 X-RAY EXAM OF ANKLE: CPT | Mod: LEFT SIDE | Performed by: RADIOLOGY

## 2025-05-27 PROCEDURE — 99244 OFF/OP CNSLTJ NEW/EST MOD 40: CPT | Performed by: ORTHOPAEDIC SURGERY

## 2025-05-27 PROCEDURE — 73610 X-RAY EXAM OF ANKLE: CPT | Mod: LT

## 2025-05-27 PROCEDURE — 99203 OFFICE O/P NEW LOW 30 MIN: CPT | Performed by: ORTHOPAEDIC SURGERY

## 2025-05-27 ASSESSMENT — PAIN - FUNCTIONAL ASSESSMENT: PAIN_FUNCTIONAL_ASSESSMENT: 0-10

## 2025-05-27 ASSESSMENT — PAIN SCALES - GENERAL: PAINLEVEL_OUTOF10: 6

## 2025-05-27 ASSESSMENT — PAIN DESCRIPTION - DESCRIPTORS: DESCRIPTORS: BURNING;ACHING

## 2025-05-27 NOTE — PATIENT INSTRUCTIONS
YOUR BOOT INSTRUCTIONS:  You can put weight on your foot and ankle while in the boot.    You can use crutches or walker for support as needed.   You should wear boot when walking or standing for 3 weeks.  You can take off your boot while bathing, sitting, stretching, icing or doing therapy exercises.  You can take your boot off at nighttime while sleeping.    BOOT WEANING:  DAY 1-- come out of boot for 1 hour (wear supportive athletic shoes and orthotic inserts), rest of the day in boot  DAY 2-- come out of boot for 2 hours (wear supportive athletic shoes and orthotic inserts), rest of the day in boot  DAY 3-- come out of boot for 3 hours (wear supportive athletic shoes and orthotic inserts), rest of the day in boot  DAY 4-- come out of boot for 4 hours (wear supportive athletic shoes and orthotic inserts), rest of the day in boot  DAY 5-- come out of boot and wear supportive shoes and orthotic inserts  * if you have pain while weaning out of boot then go back one day in the protocol (i.e. If really sore on the morning of Day 3 from coming out of boot for 2 hours the previous day, go back to Day 1 and start again through the protocol)    You can also use OTC Voltaren gel or  aspercreme ointment and apply it to the injured area.      Ice and elevate supported at the calf to reduce swelling    You can use a bucket of room temperature water with Epson salt and do your ankle/toe exercises    You can wear compression stockings to reduce swelling    1. Follow stretching exercises that were on a separate handout   2. Hold each stretch for a least 1 minute  3. Do not bounce while stretching  4. Stretch for 10 minutes at a time, 3x a day for 6 weeks then daily  5. Remember, it takes several weeks to a few months of consistent stretching to increase flexibility and decrease symptoms.     The patient was given a prescription for physical therapy.  Physical therapy is medically necessary to improve strength, balance, range of  motion and functional outcomes after injury and/or surgery.    Follow up in 6 weeks or as needed

## 2025-05-30 ENCOUNTER — APPOINTMENT (OUTPATIENT)
Dept: ORTHOPEDIC SURGERY | Facility: CLINIC | Age: 35
End: 2025-05-30

## 2025-06-04 ENCOUNTER — APPOINTMENT (OUTPATIENT)
Dept: ORTHOPEDIC SURGERY | Facility: CLINIC | Age: 35
End: 2025-06-04

## 2025-06-17 ENCOUNTER — APPOINTMENT (OUTPATIENT)
Dept: PHYSICAL THERAPY | Facility: CLINIC | Age: 35
End: 2025-06-17

## 2025-07-08 ENCOUNTER — APPOINTMENT (OUTPATIENT)
Dept: ORTHOPEDIC SURGERY | Facility: CLINIC | Age: 35
End: 2025-07-08

## 2025-09-18 ENCOUNTER — APPOINTMENT (OUTPATIENT)
Dept: CARDIOLOGY | Facility: CLINIC | Age: 35
End: 2025-09-18